# Patient Record
Sex: FEMALE | Race: WHITE | NOT HISPANIC OR LATINO | Employment: OTHER | URBAN - METROPOLITAN AREA
[De-identification: names, ages, dates, MRNs, and addresses within clinical notes are randomized per-mention and may not be internally consistent; named-entity substitution may affect disease eponyms.]

---

## 2021-08-22 PROBLEM — K56.609 SMALL BOWEL OBSTRUCTION: Status: ACTIVE | Noted: 2021-08-22

## 2021-08-23 PROBLEM — Q43.3 MIDGUT VOLVULUS: Status: ACTIVE | Noted: 2021-08-23

## 2024-10-09 ENCOUNTER — TELEPHONE (OUTPATIENT)
Dept: GASTROENTEROLOGY | Facility: CLINIC | Age: 69
End: 2024-10-09

## 2024-10-09 NOTE — TELEPHONE ENCOUNTER
Patient states she had an implant done by Dr. Santiago urologist located in [facility].   The implant company is [employer]. The patient states she been having diarrhea every since implant was placed. Patient states someone told her that Dr. Romero does fecal transplant for patients who have diarrhea for a very long time.     Informed patient Dr. Romero only does fecal transplant for C. Diff patient.   Patient stated she never been tested for C. Diff but does have diarrhea every time she eats. Patient denies stool odor   Informed patient to contact her pcp to discuss being tested for C. Diff.  Patient verbalize understand and thank me.     ----- Message from [place] sent at 10/9/2024  9:33 AM CDT -----    Name of Caller:ANGELES GASTELUM [[id number]]      When is the first available appointment? No       Symptoms:Symptom: Diarrhea  Outcome: Talk to a nurse or provider within 15 minutes.  Reason: Vomiting / pt is interested in getting an implant and has suffered with diarrhea for most of her life . Please advise       Best Call Back Number Telephone Information:  Mobile          [phone] or [phone]            Additional Information: pt gallbladder has been removed . Please advise